# Patient Record
Sex: FEMALE | URBAN - METROPOLITAN AREA
[De-identification: names, ages, dates, MRNs, and addresses within clinical notes are randomized per-mention and may not be internally consistent; named-entity substitution may affect disease eponyms.]

---

## 2017-04-24 ENCOUNTER — IMPORTED ENCOUNTER (OUTPATIENT)
Dept: URBAN - METROPOLITAN AREA CLINIC 9 | Facility: CLINIC | Age: 62
End: 2017-04-24

## 2018-06-04 ENCOUNTER — IMPORTED ENCOUNTER (OUTPATIENT)
Dept: URBAN - METROPOLITAN AREA CLINIC 9 | Facility: CLINIC | Age: 63
End: 2018-06-04

## 2018-06-12 ENCOUNTER — IMPORTED ENCOUNTER (OUTPATIENT)
Dept: URBAN - METROPOLITAN AREA CLINIC 9 | Facility: CLINIC | Age: 63
End: 2018-06-12

## 2018-06-21 ENCOUNTER — IMPORTED ENCOUNTER (OUTPATIENT)
Dept: URBAN - METROPOLITAN AREA CLINIC 9 | Facility: CLINIC | Age: 63
End: 2018-06-21

## 2018-07-11 ENCOUNTER — IMPORTED ENCOUNTER (OUTPATIENT)
Dept: URBAN - METROPOLITAN AREA CLINIC 9 | Facility: CLINIC | Age: 63
End: 2018-07-11

## 2019-11-21 ENCOUNTER — IMPORTED ENCOUNTER (OUTPATIENT)
Dept: URBAN - METROPOLITAN AREA CLINIC 9 | Facility: CLINIC | Age: 64
End: 2019-11-21

## 2021-02-02 ENCOUNTER — IMPORTED ENCOUNTER (OUTPATIENT)
Dept: URBAN - METROPOLITAN AREA CLINIC 9 | Facility: CLINIC | Age: 66
End: 2021-02-02

## 2021-02-17 ENCOUNTER — IMPORTED ENCOUNTER (OUTPATIENT)
Dept: URBAN - METROPOLITAN AREA CLINIC 9 | Facility: CLINIC | Age: 66
End: 2021-02-17

## 2021-02-17 PROBLEM — H52.03: Noted: 2021-02-17

## 2021-02-17 PROBLEM — H52.4: Noted: 2021-02-17

## 2021-10-16 ASSESSMENT — VISUAL ACUITY
OD_CC: 20/20 - SN
OD_CC: 20/50 - SN
OS_SC: 20/200 SN
OD_CC: 20/25 - SN
OD_CC: 20/20 SN
OD_CC: 20/20 SN
OS_CC: 20/20 SN
OD_SC: 20/100 SN
OS_CC: 20/20 SN
OS_CC: 20/30 -2 SN
OS_CC: 20/20 SN
OS_CC: 20/20 SN
OD_CC: 20/25 - SN
OS_CC: 14.0
OD_CC: 20/30 - SN
OD_CC: 20/30 - SN
OD_CC: 14.0
OD_CC: 20/20 -2 SN
OD_CC: 20/25 - SN
OS_CC: 20/20 - SN
OS_CC: 20/25 - SN
OS_CC: 20/30 SN
OS_CC: 20/20 - SN
OD_CC: 20/20 SN
OS_CC: 20/25 - SN
OD_CC: 20/20 - SN

## 2021-10-16 ASSESSMENT — KERATOMETRY
OS_K2POWER_DIOPTERS: 45.5
OD_AXISANGLE2_DEGREES: 157
OS_AXISANGLE2_DEGREES: 39
OD_AXISANGLE2_DEGREES: 178
OD_K2POWER_DIOPTERS: 44.75
OD_AXISANGLE2_DEGREES: 101
OS_AXISANGLE2_DEGREES: 23
OS_AXISANGLE_DEGREES: 148
OD_K2POWER_DIOPTERS: 45
OD_K1POWER_DIOPTERS: 44.75
OS_AXISANGLE_DEGREES: 131
OD_AXISANGLE_DEGREES: 165
OD_K1POWER_DIOPTERS: 45
OS_K1POWER_DIOPTERS: 45.25
OS_K2POWER_DIOPTERS: 45.5
OS_AXISANGLE2_DEGREES: 58
OD_AXISANGLE_DEGREES: 11
OD_AXISANGLE2_DEGREES: 75
OS_K1POWER_DIOPTERS: 45.25
OD_K1POWER_DIOPTERS: 44.5
OS_AXISANGLE2_DEGREES: 41
OS_AXISANGLE_DEGREES: 129
OD_AXISANGLE_DEGREES: 88
OD_K2POWER_DIOPTERS: 45.25
OS_K1POWER_DIOPTERS: 45.25
OD_K1POWER_DIOPTERS: 44.875
OS_AXISANGLE_DEGREES: 113
OS_K2POWER_DIOPTERS: 45.5
OS_K2POWER_DIOPTERS: 45.75
OD_K2POWER_DIOPTERS: 45
OD_AXISANGLE_DEGREES: 67
OS_K1POWER_DIOPTERS: 45.375

## 2021-10-16 ASSESSMENT — TONOMETRY
OS_IOP_MMHG: 13
OD_IOP_MMHG: 12
OD_IOP_MMHG: 12
OD_IOP_MMHG: 14
OS_IOP_MMHG: 17
OS_IOP_MMHG: 13
OD_IOP_MMHG: 17
OS_IOP_MMHG: 12

## 2022-05-10 NOTE — PATIENT DISCUSSION
***The patient has made a decision for surgery. The patient was informed that with the Basic option, they will most likely need prescription glasses at all focal points after surgery. The patient elects Basic OD, goal of emmetropia. 7/07/22 MWilliams***.

## 2022-07-03 RX ORDER — TOLTERODINE TARTRATE 2 MG/1
TABLET, EXTENDED RELEASE ORAL
COMMUNITY

## 2022-07-03 RX ORDER — BENAZEPRIL HYDROCHLORIDE 5 MG/1
TABLET, FILM COATED ORAL
COMMUNITY

## 2022-07-03 RX ORDER — GUAIFENESIN 600 MG/1
TABLET, EXTENDED RELEASE ORAL
COMMUNITY

## 2022-07-03 RX ORDER — HYDROCHLOROTHIAZIDE 12.5 MG/1
1 CAPSULE, GELATIN COATED ORAL
COMMUNITY
End: 2022-09-20

## 2022-07-03 RX ORDER — LOSARTAN POTASSIUM 50 MG/1
TABLET ORAL
COMMUNITY

## 2022-09-20 PROBLEM — M77.10 LATERAL EPICONDYLITIS: Status: ACTIVE | Noted: 2022-09-20

## 2022-09-20 PROBLEM — M54.30 SCIATICA: Status: ACTIVE | Noted: 2022-09-20

## 2022-09-20 PROBLEM — M25.559 HIP PAIN: Status: ACTIVE | Noted: 2022-09-20

## 2022-09-20 PROBLEM — M25.562 LEFT KNEE PAIN: Status: ACTIVE | Noted: 2022-09-20

## 2022-09-20 PROBLEM — S83.232A COMPLEX TEAR OF MEDIAL MENISCUS OF LEFT KNEE AS CURRENT INJURY: Status: ACTIVE | Noted: 2022-09-20

## 2022-09-20 PROBLEM — N94.10 DYSPAREUNIA IN FEMALE: Status: ACTIVE | Noted: 2022-09-20

## 2022-09-20 PROBLEM — M25.529 ELBOW PAIN: Status: ACTIVE | Noted: 2022-09-20

## 2022-09-20 PROBLEM — N95.1 MENOPAUSAL SYMPTOM: Status: ACTIVE | Noted: 2022-09-20

## 2022-09-20 PROBLEM — M70.70 HIP BURSITIS: Status: ACTIVE | Noted: 2022-09-20

## 2022-12-09 ENCOUNTER — COMPREHENSIVE EXAM (OUTPATIENT)
Dept: URBAN - NONMETROPOLITAN AREA CLINIC 6 | Facility: CLINIC | Age: 67
End: 2022-12-09

## 2022-12-09 PROCEDURE — 92014 COMPRE OPH EXAM EST PT 1/>: CPT

## 2022-12-09 PROCEDURE — 92134 CPTRZ OPH DX IMG PST SGM RTA: CPT

## 2022-12-09 PROCEDURE — 92310D CONTACT LENS 80

## 2022-12-09 PROCEDURE — 92015 DETERMINE REFRACTIVE STATE: CPT

## 2022-12-09 ASSESSMENT — VISUAL ACUITY
OU_CC: 20/25-1
OD_GLARE: 20/40
OS_SC: 20/200
OS_CC: 20/25-1
OS_GLARE: 20/40
OU_SC: 20/200
OD_SC: 20/200
OD_CC: 20/40-2

## 2022-12-09 ASSESSMENT — TONOMETRY
OD_IOP_MMHG: 14
OS_IOP_MMHG: 13

## 2024-06-03 ENCOUNTER — ESTABLISHED PATIENT (OUTPATIENT)
Dept: URBAN - NONMETROPOLITAN AREA CLINIC 6 | Facility: CLINIC | Age: 69
End: 2024-06-03

## 2024-06-03 DIAGNOSIS — H04.123: ICD-10-CM

## 2024-06-03 DIAGNOSIS — H43.392: ICD-10-CM

## 2024-06-03 DIAGNOSIS — H52.4: ICD-10-CM

## 2024-06-03 DIAGNOSIS — H02.831: ICD-10-CM

## 2024-06-03 DIAGNOSIS — H35.3131: ICD-10-CM

## 2024-06-03 DIAGNOSIS — H25.13: ICD-10-CM

## 2024-06-03 DIAGNOSIS — H02.834: ICD-10-CM

## 2024-06-03 DIAGNOSIS — H52.03: ICD-10-CM

## 2024-06-03 PROCEDURE — 92310F CONTACT LENS 150

## 2024-06-03 PROCEDURE — 92014 COMPRE OPH EXAM EST PT 1/>: CPT

## 2024-06-03 PROCEDURE — 92015 DETERMINE REFRACTIVE STATE: CPT

## 2024-06-03 PROCEDURE — 92134 CPTRZ OPH DX IMG PST SGM RTA: CPT

## 2024-06-03 ASSESSMENT — TONOMETRY
OD_IOP_MMHG: 10
OS_IOP_MMHG: 12

## 2024-06-03 ASSESSMENT — VISUAL ACUITY
OS_GLARE: 20/40
OD_GLARE: 20/40
OS_CC: 20/60
OD_CC: 20/30-2

## 2024-12-02 ENCOUNTER — FOLLOW UP (OUTPATIENT)
Age: 69
End: 2024-12-02

## 2024-12-02 DIAGNOSIS — H35.3131: ICD-10-CM

## 2024-12-02 DIAGNOSIS — H43.392: ICD-10-CM

## 2024-12-02 DIAGNOSIS — H04.123: ICD-10-CM

## 2024-12-02 DIAGNOSIS — H25.13: ICD-10-CM

## 2024-12-02 PROCEDURE — 92134 CPTRZ OPH DX IMG PST SGM RTA: CPT

## 2024-12-02 PROCEDURE — 92014 COMPRE OPH EXAM EST PT 1/>: CPT

## 2025-06-17 ENCOUNTER — COMPREHENSIVE EXAM (OUTPATIENT)
Age: 70
End: 2025-06-17

## 2025-06-17 DIAGNOSIS — H25.13: ICD-10-CM

## 2025-06-17 DIAGNOSIS — H35.3131: ICD-10-CM

## 2025-06-17 DIAGNOSIS — H43.392: ICD-10-CM

## 2025-06-17 DIAGNOSIS — H04.123: ICD-10-CM

## 2025-06-17 PROCEDURE — 92015 DETERMINE REFRACTIVE STATE: CPT

## 2025-06-17 PROCEDURE — 92134 CPTRZ OPH DX IMG PST SGM RTA: CPT

## 2025-06-17 PROCEDURE — 92014 COMPRE OPH EXAM EST PT 1/>: CPT
